# Patient Record
Sex: MALE | Race: ASIAN | NOT HISPANIC OR LATINO
[De-identification: names, ages, dates, MRNs, and addresses within clinical notes are randomized per-mention and may not be internally consistent; named-entity substitution may affect disease eponyms.]

---

## 2020-12-12 ENCOUNTER — TRANSCRIPTION ENCOUNTER (OUTPATIENT)
Age: 20
End: 2020-12-12

## 2021-03-07 ENCOUNTER — TRANSCRIPTION ENCOUNTER (OUTPATIENT)
Age: 21
End: 2021-03-07

## 2022-04-12 ENCOUNTER — APPOINTMENT (OUTPATIENT)
Dept: ORTHOPEDIC SURGERY | Facility: CLINIC | Age: 22
End: 2022-04-12

## 2022-04-12 PROBLEM — Z00.00 ENCOUNTER FOR PREVENTIVE HEALTH EXAMINATION: Status: ACTIVE | Noted: 2022-04-12

## 2022-05-02 ENCOUNTER — APPOINTMENT (OUTPATIENT)
Dept: ORTHOPEDIC SURGERY | Facility: CLINIC | Age: 22
End: 2022-05-02
Payer: COMMERCIAL

## 2022-05-02 VITALS — WEIGHT: 180 LBS | HEIGHT: 67.5 IN | BODY MASS INDEX: 27.92 KG/M2

## 2022-05-02 DIAGNOSIS — Z78.9 OTHER SPECIFIED HEALTH STATUS: ICD-10-CM

## 2022-05-02 PROCEDURE — 99213 OFFICE O/P EST LOW 20 MIN: CPT

## 2022-05-02 NOTE — IMAGING
[de-identified] : Left knee No effusion, no warmth\par Anterior and medial retinacular tenderness to palpation\par Range of motion 0-140; anterior pain with flexion; tight hamstrings\par 5/5 quadriceps and hamstring strength\par Negative Lachman, negative Josee, negative patella apprehension\par Motor and sensory intact distally\par Non-antalgic gait\par

## 2022-05-02 NOTE — ASSESSMENT
[FreeTextEntry1] : DOING VERY WELL CONSERVATIVELY BUT SOME MEDIAL INSTABILITY PERSISTS\par CONTINUED VMO STRNEGHTENING AND AVOIDANCE OF EXPLOSIVE ACTIVITIES UNTIL STABILIZES\par DISCUSSED REFERRAL FOR JORDEN/MPFL IF RECURS\par CONTINUE LIGHT DUTY ( RESERVE)\par

## 2022-09-19 ENCOUNTER — APPOINTMENT (OUTPATIENT)
Dept: ORTHOPEDIC SURGERY | Facility: CLINIC | Age: 22
End: 2022-09-19

## 2022-09-19 VITALS — BODY MASS INDEX: 28.25 KG/M2 | WEIGHT: 180 LBS | HEIGHT: 67 IN

## 2022-09-19 DIAGNOSIS — M23.42 LOOSE BODY IN KNEE, LEFT KNEE: ICD-10-CM

## 2022-09-19 DIAGNOSIS — S83.005D UNSPECIFIED DISLOCATION OF LEFT PATELLA, SUBSEQUENT ENCOUNTER: ICD-10-CM

## 2022-09-19 PROCEDURE — 99213 OFFICE O/P EST LOW 20 MIN: CPT

## 2022-09-19 NOTE — HISTORY OF PRESENT ILLNESS
[Left Leg] : left leg [Gradual] : gradual [3] : 3 [1] : 2 [Localized] : localized [Sharp] : sharp [Tightness] : tightness [Intermittent] : intermittent [Physical therapy] : physical therapy [Walking] : walking [de-identified] : pt presents here today with left knee pain \par pt has try physical therapy with some improvement and ROM is better\par 9/19/22: FOLLOW UP LEFT KNEE, FEELING BETTER, NO EPISODES OF DISLOCATION [] : no [FreeTextEntry1] : knee [FreeTextEntry5] : skiing accident  [de-identified] : physical therapy

## 2022-09-19 NOTE — IMAGING
[de-identified] : LEFT KNEE\par No effusion, no warmth, no ecchymosis, no erythema.\par No tenderness to palpation, no palpable defects.\par Range of motion 0-140 without pain\par 5/5 quadriceps and hamstring strength\par Negative Lachman, negative Josee, negative anterior drawer, negative posterior drawer, negative patella apprehension; no extensor lag: no varus or valgus instability.\par Motor and sensory intact distally\par Negative Donaldo\par Non-antalgic gait\par

## 2022-09-19 NOTE — ASSESSMENT
[FreeTextEntry1] : DISCUSSED CONTINUED CONS MGMT\par HE WILL MONITOR FOR REPEAT DISLOCATION\par CONT HEP\par ACTIVITY AS TOLERATED\par PATELLA STABILIZING BRACE PRN\par DISCUSSED REFERRAL FOR JORDEN/MPFL IF RECURS - REFER TO DR. LIZ

## 2022-10-06 ENCOUNTER — APPOINTMENT (OUTPATIENT)
Dept: ORTHOPEDIC SURGERY | Facility: CLINIC | Age: 22
End: 2022-10-06

## 2022-10-27 ENCOUNTER — APPOINTMENT (OUTPATIENT)
Dept: ORTHOPEDIC SURGERY | Facility: CLINIC | Age: 22
End: 2022-10-27

## 2022-12-09 ENCOUNTER — NON-APPOINTMENT (OUTPATIENT)
Age: 22
End: 2022-12-09

## 2022-12-23 ENCOUNTER — APPOINTMENT (OUTPATIENT)
Dept: ORTHOPEDIC SURGERY | Facility: CLINIC | Age: 22
End: 2022-12-23

## 2022-12-23 VITALS — BODY MASS INDEX: 28.25 KG/M2 | HEIGHT: 67 IN | WEIGHT: 180 LBS

## 2022-12-23 DIAGNOSIS — Z78.9 OTHER SPECIFIED HEALTH STATUS: ICD-10-CM

## 2022-12-23 PROCEDURE — 72100 X-RAY EXAM L-S SPINE 2/3 VWS: CPT

## 2022-12-23 PROCEDURE — 99203 OFFICE O/P NEW LOW 30 MIN: CPT

## 2022-12-23 NOTE — HISTORY OF PRESENT ILLNESS
[Lower back] : lower back [Gradual] : gradual [2] : 2 [3] : 3 [Dull/Aching] : dull/aching [Localized] : localized [Constant] : constant [Walking] : walking [de-identified] : No injury, notices back pain haven when getting up from seated position. Does get occasional pain radiating up from foot to buttock only after jumping out of the truck for a second and then goes away. No bowel or bladder changes [] : no [FreeTextEntry3] : chronic [FreeTextEntry9] : stretching [FreeTextEntry5] : pt stated he has been having constant back pain with nni

## 2023-02-03 ENCOUNTER — APPOINTMENT (OUTPATIENT)
Dept: ORTHOPEDIC SURGERY | Facility: CLINIC | Age: 23
End: 2023-02-03

## 2023-04-06 ENCOUNTER — APPOINTMENT (OUTPATIENT)
Dept: ORTHOPEDIC SURGERY | Facility: CLINIC | Age: 23
End: 2023-04-06
Payer: COMMERCIAL

## 2023-04-06 PROCEDURE — 99213 OFFICE O/P EST LOW 20 MIN: CPT

## 2023-04-06 NOTE — HISTORY OF PRESENT ILLNESS
[de-identified] : No injury, notices back pain haven when getting up from seated position. Does get occasional pain radiating up from foot to buttock only after jumping out of the truck for a second and then goes away. No bowel or bladder changes [Lower back] : lower back [Gradual] : gradual [2] : 2 [3] : 3 [Dull/Aching] : dull/aching [Localized] : localized [Constant] : constant [Walking] : walking [] : no [FreeTextEntry3] : chronic [FreeTextEntry5] : pt stated he has been having constant back pain with nni  [FreeTextEntry9] : stretching

## 2023-07-06 ENCOUNTER — APPOINTMENT (OUTPATIENT)
Dept: ORTHOPEDIC SURGERY | Facility: CLINIC | Age: 23
End: 2023-07-06

## 2023-08-08 ENCOUNTER — APPOINTMENT (OUTPATIENT)
Dept: ORTHOPEDIC SURGERY | Facility: CLINIC | Age: 23
End: 2023-08-08
Payer: COMMERCIAL

## 2023-08-08 VITALS — BODY MASS INDEX: 27.47 KG/M2 | HEIGHT: 67 IN | WEIGHT: 175 LBS

## 2023-08-08 PROCEDURE — 99213 OFFICE O/P EST LOW 20 MIN: CPT

## 2023-08-08 NOTE — HISTORY OF PRESENT ILLNESS
[Lower back] : lower back [Gradual] : gradual [2] : 2 [3] : 3 [Dull/Aching] : dull/aching [Localized] : localized [Constant] : constant [Walking] : walking [de-identified] : No injury, notices back pain haven when getting up from seated position. Does get occasional pain radiating up from foot to buttock only after jumping out of the truck for a second and then goes away. No bowel or bladder changes [] : no [FreeTextEntry3] : chronic [FreeTextEntry5] : pt stated he has been having constant back pain with nni  [FreeTextEntry9] : stretching

## 2023-08-08 NOTE — REASON FOR VISIT
[FreeTextEntry2] : 8/8/23- Still with daily back pain, has lost 30 pounds, had a job position change which has helped, did PT, now doing HEP 4/6/23- Wasn't able to do PT beyond the evaluation due to  orders

## 2023-08-12 ENCOUNTER — APPOINTMENT (OUTPATIENT)
Dept: MRI IMAGING | Facility: CLINIC | Age: 23
End: 2023-08-12
Payer: COMMERCIAL

## 2023-08-12 PROCEDURE — 72148 MRI LUMBAR SPINE W/O DYE: CPT

## 2023-08-24 ENCOUNTER — APPOINTMENT (OUTPATIENT)
Dept: ORTHOPEDIC SURGERY | Facility: CLINIC | Age: 23
End: 2023-08-24

## 2023-09-12 ENCOUNTER — APPOINTMENT (OUTPATIENT)
Dept: ORTHOPEDIC SURGERY | Facility: CLINIC | Age: 23
End: 2023-09-12

## 2023-10-06 ENCOUNTER — APPOINTMENT (OUTPATIENT)
Dept: ORTHOPEDIC SURGERY | Facility: CLINIC | Age: 23
End: 2023-10-06
Payer: COMMERCIAL

## 2023-10-06 DIAGNOSIS — M54.50 LOW BACK PAIN, UNSPECIFIED: ICD-10-CM

## 2023-10-06 PROCEDURE — 99213 OFFICE O/P EST LOW 20 MIN: CPT

## 2023-11-17 ENCOUNTER — APPOINTMENT (OUTPATIENT)
Dept: ORTHOPEDIC SURGERY | Facility: CLINIC | Age: 23
End: 2023-11-17

## 2023-12-28 ENCOUNTER — APPOINTMENT (OUTPATIENT)
Dept: ORTHOPEDIC SURGERY | Facility: CLINIC | Age: 23
End: 2023-12-28
Payer: COMMERCIAL

## 2023-12-28 VITALS — WEIGHT: 175 LBS | HEIGHT: 67 IN | BODY MASS INDEX: 27.47 KG/M2

## 2023-12-28 DIAGNOSIS — M51.26 OTHER INTERVERTEBRAL DISC DISPLACEMENT, LUMBAR REGION: ICD-10-CM

## 2023-12-28 PROCEDURE — 99213 OFFICE O/P EST LOW 20 MIN: CPT

## 2023-12-28 NOTE — REASON FOR VISIT
[FreeTextEntry2] : 12/28/23: Here for low back follow up. Doing well.  10/06/2023 Had MRI: Lateral disc herniation L4/5 impinging on exiting nerve root

## 2023-12-28 NOTE — HISTORY OF PRESENT ILLNESS
[Lower back] : lower back [Gradual] : gradual [2] : 2 [3] : 3 [Dull/Aching] : dull/aching [Localized] : localized [Constant] : constant [Walking] : walking [de-identified] :  8/8/23- Still with daily back pain, has lost 30 pounds, had a job position change which has helped, did PT, now doing HEP 4/6/23- Wasn't able to do PT beyond the evaluation due to  orders No injury, notices back pain haven when getting up from seated position. Does get occasional pain radiating up from foot to buttock only after jumping out of the truck for a second and then goes away. No bowel or bladder changes [] : no [FreeTextEntry3] : chronic [FreeTextEntry5] : pt stated he has been having constant back pain with nni  [FreeTextEntry9] : stretching

## 2024-03-22 NOTE — DISCUSSION/SUMMARY
Last visit 01/04/2024    Aly from [facility] stated that the Rx script supply request form was faxed back on 03/20/2024 (originally faxed on 03/19), because it needs the amount of refills and the Medicaid ID number entered, and each section must include Dr Cuenca initials and date next to it. Please fax back.    [de-identified] : PT prescribed